# Patient Record
Sex: MALE | Race: WHITE | NOT HISPANIC OR LATINO | Employment: OTHER | URBAN - METROPOLITAN AREA
[De-identification: names, ages, dates, MRNs, and addresses within clinical notes are randomized per-mention and may not be internally consistent; named-entity substitution may affect disease eponyms.]

---

## 2018-10-10 ENCOUNTER — APPOINTMENT (OUTPATIENT)
Dept: GENERAL RADIOLOGY | Facility: HOSPITAL | Age: 63
End: 2018-10-10

## 2018-10-10 ENCOUNTER — HOSPITAL ENCOUNTER (EMERGENCY)
Facility: HOSPITAL | Age: 63
Discharge: HOME | End: 2018-10-10
Attending: EMERGENCY MEDICINE | Admitting: EMERGENCY MEDICINE

## 2018-10-10 VITALS
WEIGHT: 101.8 LBS | DIASTOLIC BLOOD PRESSURE: 93 MMHG | HEIGHT: 70 IN | SYSTOLIC BLOOD PRESSURE: 137 MMHG | RESPIRATION RATE: 18 BRPM | HEART RATE: 91 BPM | OXYGEN SATURATION: 98 % | TEMPERATURE: 98.2 F | BODY MASS INDEX: 14.58 KG/M2
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
## 2018-10-10 DIAGNOSIS — S39.012A STRAIN OF LUMBAR REGION, INITIAL ENCOUNTER: ICD-10-CM

## 2018-10-10 DIAGNOSIS — M54.32 SCIATICA OF LEFT SIDE: ICD-10-CM

## 2018-10-10 PROCEDURE — 99284 EMERGENCY DEPT VISIT MOD MDM: CPT

## 2018-10-10 PROCEDURE — 63710000001 PREDNISONE PER 1 MG: Performed by: PHYSICIAN ASSISTANT

## 2018-10-10 PROCEDURE — 73552 X-RAY EXAM OF FEMUR 2/>: CPT

## 2018-10-10 PROCEDURE — 72110 X-RAY EXAM L-2 SPINE 4/>VWS: CPT

## 2018-10-10 RX ORDER — PREDNISONE 20 MG/1
20 TABLET ORAL 2 TIMES DAILY
Qty: 10 TABLET | Refills: 0 | Status: SHIPPED | OUTPATIENT
Start: 2018-10-10 | End: 2018-10-15

## 2018-10-10 RX ORDER — OXYCODONE HYDROCHLORIDE AND ACETAMINOPHEN 5; 325 MG/1; MG/1
1 TABLET ORAL ONCE
Status: COMPLETED | OUTPATIENT
Start: 2018-10-10 | End: 2018-10-10

## 2018-10-10 RX ORDER — PREDNISONE 20 MG/1
60 TABLET ORAL ONCE
Status: COMPLETED | OUTPATIENT
Start: 2018-10-10 | End: 2018-10-10

## 2018-10-10 RX ORDER — CYCLOBENZAPRINE HCL 10 MG
10 TABLET ORAL 3 TIMES DAILY
Qty: 20 TABLET | Refills: 0 | Status: SHIPPED | OUTPATIENT
Start: 2018-10-10

## 2018-10-10 RX ADMIN — OXYCODONE HYDROCHLORIDE AND ACETAMINOPHEN 1 TABLET: 5; 325 TABLET ORAL at 02:26

## 2018-10-10 RX ADMIN — PREDNISONE 60 MG: 20 TABLET ORAL at 04:45

## 2018-10-10 NOTE — ED TRIAGE NOTES
Pt arrived via ems. Pt states he was walking down the street and tripped and fell. Pt states he his having pain in his left thigh.

## 2018-10-10 NOTE — ED PROVIDER NOTES
EMERGENCY DEPARTMENT ENCOUNTER    CHIEF COMPLAINT  Chief Complaint: Fall  History given by: Patient  History limited by: none  Room Number: 10/10  PMD: Provider, No Known      HPI:  Pt is a 63 y.o. male who presents complaining of left thigh pain s/p fall just PTA. Pt states pain radiates to left knee. Pt reports chronic intermittent leg pain. Pt states he was walking on sidewalk to catch the bus and caught toe on broken, uneven sidewalk. Pt states he tripped forward and caught himself with right arm and rolled on ground. Pt denies head injury or LOC. Pt denies saddle anesthesia, incontinence, abdominal pain or back pain. Pt denies Hx of DM. PMHx of lumbar disc degeneration.     Duration:  Just PTA  Onset: sudden  Timing: constant  Location: left thigh  Radiation: none  Quality: pain  Intensity/Severity: moderate  Progression: unchanged  Associated Symptoms: none  Aggravating Factors: none  Alleviating Factors: none  Previous Episodes: none  Treatment before arrival: none    PAST MEDICAL HISTORY  Active Ambulatory Problems     Diagnosis Date Noted   • No Active Ambulatory Problems     Resolved Ambulatory Problems     Diagnosis Date Noted   • No Resolved Ambulatory Problems     Past Medical History:   Diagnosis Date   • Chronic pain syndrome    • COPD (chronic obstructive pulmonary disease) (CMS/HCC)    • Disc degeneration, lumbar    • Epistaxis    • Hepatitis C    • Lumbar discitis    • Patent foramen ovale    • Septic arthritis (CMS/HCC)    • Spinal epidural abscess        PAST SURGICAL HISTORY  Past Surgical History:   Procedure Laterality Date   • LUMBAR LAMINECTOMY     • OTHER SURGICAL HISTORY      Elbow incision and drainage   • OTHER SURGICAL HISTORY      Spinal Diskectomy Lumbar       FAMILY HISTORY  History reviewed. No pertinent family history.    SOCIAL HISTORY  Social History     Social History   • Marital status:      Spouse name: N/A   • Number of children: N/A   • Years of education: N/A      Occupational History   • Not on file.     Social History Main Topics   • Smoking status: Current Every Day Smoker   • Smokeless tobacco: Not on file   • Alcohol use Not on file   • Drug use: Unknown   • Sexual activity: Not on file     Other Topics Concern   • Not on file     Social History Narrative   • No narrative on file       ALLERGIES  Patient has no known allergies.    REVIEW OF SYSTEMS  Review of Systems   Constitutional: Negative for activity change, appetite change and fever.   HENT: Negative for congestion and sore throat.    Eyes: Negative.    Respiratory: Negative for cough and shortness of breath.    Cardiovascular: Negative for chest pain and leg swelling.   Gastrointestinal: Negative for abdominal pain, diarrhea and vomiting.   Endocrine: Negative.    Genitourinary: Negative for decreased urine volume and dysuria.   Musculoskeletal: Positive for myalgias (left thigh). Negative for neck pain.   Skin: Negative for rash and wound.   Allergic/Immunologic: Negative.    Neurological: Negative for weakness, numbness and headaches.   Hematological: Negative.    Psychiatric/Behavioral: Negative.    All other systems reviewed and are negative.      PHYSICAL EXAM  ED Triage Vitals [10/10/18 0158]   Temp Heart Rate Resp BP SpO2   -- 81 18 154/97 100 %      Temp src Heart Rate Source Patient Position BP Location FiO2 (%)   -- -- -- -- --       Physical Exam   Constitutional: No distress.   HENT:   Head: Normocephalic and atraumatic.   Eyes: EOM are normal.   Neck: Normal range of motion.   Cardiovascular:   Pulses:       Dorsalis pedis pulses are 1+ on the right side, and 2+ on the left side.   R>L DP pulse   Pulmonary/Chest: No respiratory distress.   Abdominal: He exhibits no mass. There is no tenderness.   Musculoskeletal: He exhibits no edema.        Lumbar back: He exhibits tenderness (mild).   Tenderness to distal left hamstring no deformity or bruising.    Neurological: He is alert. He has an abnormal  Straight Leg Raise Test (60 degrees).   Skin: Skin is warm and dry.   Nursing note and vitals reviewed.      RADIOLOGY  XR Femur 2 View Left   Final Result   1. Postsurgical and degenerative changes as above, no acute osseous   finding.                       TWO-VIEW LEFT FEMUR       HISTORY: Fall with left leg injury.       FINDINGS: 2 views of the left femur were submitted. There is no fracture   or dislocation. Soft tissues appear unremarkable.               IMPRESSION:   1. No acute osseous abnormality.       This report was finalized on 10/10/2018 3:34 AM by Leighton Lopez M.D.          XR Spine Lumbar 4+ View   Final Result   1. Postsurgical and degenerative changes as above, no acute osseous   finding.                       TWO-VIEW LEFT FEMUR       HISTORY: Fall with left leg injury.       FINDINGS: 2 views of the left femur were submitted. There is no fracture   or dislocation. Soft tissues appear unremarkable.               IMPRESSION:   1. No acute osseous abnormality.       This report was finalized on 10/10/2018 3:34 AM by Leighton Lopez M.D.               I ordered the above noted radiological studies. Interpreted by radiologist.  Reviewed by me in PACS.       PROCEDURES  Procedures      PROGRESS AND CONSULTS     0325  Pt recheck. Pt states he needs to get back on track with PCP. Plan to plug pt in. Notified pt of imaging results that shows no acute Fx. Discussed plan to discharge pt with prescriptions for deltasone and flexeril. Gave crutches and post-op shoe. Pt understands and agrees with treatment plan, all concerns addressed.     0355  Ordered DOROTA.     0434  Ordered Deltasone.     0450  Reviewed pt's history and workup with Dr. Bell.  After a bedside evaluation; Dr Bell agrees with the plan of care    MEDICAL DECISION MAKING  Results were reviewed/discussed with the patient and they were also made aware of online access. Pt also made aware that some labs, such as cultures, will not be  resulted during ER visit and follow up with PMD is necessary.     MDM  Number of Diagnoses or Management Options     Amount and/or Complexity of Data Reviewed  Tests in the radiology section of CPT®: ordered and reviewed (XRs show degenerative changes but no acute Fx. )           DIAGNOSIS  Final diagnoses:   Fall, initial encounter   Strain of lumbar region, initial encounter   Sciatica of left side       DISPOSITION  DISCHARGE    Patient discharged in stable condition.    Reviewed implications of results, diagnosis, meds, responsibility to follow up, warning signs and symptoms of possible worsening, potential complications and reasons to return to ER.    Patient/Family voiced understanding of above instructions.    Discussed plan for discharge, as there is no emergent indication for admission. Patient referred to primary care provider for BP management due to today's BP. Pt/family is agreeable and understands need for follow up and repeat testing.  Pt is aware that discharge does not mean that nothing is wrong but it indicates no emergency is present that requires admission and they must continue care with follow-up as given below or physician of their choice.     FOLLOW-UP  PATIENT LIAISON [place] [id number]  [phone]  Schedule an appointment as soon as possible for a visit in 1 week  For further evaluation and treatment if not well         Medication List      New Prescriptions    cyclobenzaprine 10 MG tablet  Commonly known as:  FLEXERIL  Take 1 tablet by mouth 3 (Three) Times a Day.     predniSONE 20 MG tablet  Commonly known as:  DELTASONE  Take 1 tablet by mouth 2 (Two) Times a Day for 5 days.        Stop    HYDROcodone-acetaminophen 5-325 MG per tablet  Commonly known as:  NORCO     methadone 10 MG tablet  Commonly known as:  DOLOPHINE              Latest Documented Vital Signs:  As of 5:32 AM  BP- 137/93 HR- 91 Temp- 98.2 °F (36.8 °C) (Oral) O2 sat- 98%    --  Documentation  assistance provided by rick Marshall for Sorin Lawrence PA-C.  Information recorded by the scriblaurie was done at my direction and has been verified and validated by me.            Mick Marshall  10/10/18 0530       Rom Lawrence III, PA  10/10/18 0532

## 2018-10-10 NOTE — ED PROVIDER NOTES
Pt presents to the ED c/o left thigh pain secondary to a mechanical fall. Pt states that the pain radiates from the buttock, down the posterior thigh, and to his knee. Pt states that he has had similar pain in the past. Pt denies saddle anesthesia or loss of bowel/bladder control. On exam, there is no tenderness to the thigh. Pain with straight leg raise. XR left femur and XR lumbar spine shows NAD. I agree with the plan for d/c with rx for muscle relaxer and steroids.      Attestation:  The CHRISTINA and I have discussed this patient's history, physical exam, and treatment plan.  I have reviewed the documentation and personally had a face to face interaction with the patient. I affirm the documentation and agree with the treatment and plan.  The attached note describes my personal findings.      Documentation assistance provided by rick Perez for Dr Bell. Information recorded by the rick was done at my direction and has been verified and validated by me.     Camelia Perez  10/10/18 0452       Mason Bell MD  10/10/18 6121